# Patient Record
Sex: FEMALE | Race: WHITE | Employment: PART TIME | URBAN - METROPOLITAN AREA
[De-identification: names, ages, dates, MRNs, and addresses within clinical notes are randomized per-mention and may not be internally consistent; named-entity substitution may affect disease eponyms.]

---

## 2019-04-11 LAB
HPV COMMENT: NORMAL
HPV TYPE 16: NOT DETECTED
HPV TYPE 18: NOT DETECTED
HPVOH (OTHER TYPES): NOT DETECTED

## 2019-09-11 ENCOUNTER — HOSPITAL ENCOUNTER (EMERGENCY)
Age: 34
Discharge: HOME | End: 2019-09-11
Payer: COMMERCIAL

## 2019-09-11 VITALS
DIASTOLIC BLOOD PRESSURE: 91 MMHG | HEIGHT: 70 IN | TEMPERATURE: 98 F | HEART RATE: 86 BPM | SYSTOLIC BLOOD PRESSURE: 134 MMHG | BODY MASS INDEX: 29.41 KG/M2 | OXYGEN SATURATION: 96 % | RESPIRATION RATE: 16 BRPM

## 2019-09-11 DIAGNOSIS — M54.41 ACUTE RIGHT-SIDED LOW BACK PAIN WITH RIGHT-SIDED SCIATICA: Primary | ICD-10-CM

## 2019-09-11 PROCEDURE — 96372 THER/PROPH/DIAG INJ SC/IM: CPT

## 2019-09-11 PROCEDURE — 99283 EMERGENCY DEPT VISIT LOW MDM: CPT

## 2019-09-11 PROCEDURE — 6370000000 HC RX 637 (ALT 250 FOR IP): Performed by: PHYSICIAN ASSISTANT

## 2019-09-11 PROCEDURE — 6360000002 HC RX W HCPCS: Performed by: PHYSICIAN ASSISTANT

## 2019-09-11 RX ORDER — KETOROLAC TROMETHAMINE 30 MG/ML
30 INJECTION, SOLUTION INTRAMUSCULAR; INTRAVENOUS ONCE
Status: COMPLETED | OUTPATIENT
Start: 2019-09-11 | End: 2019-09-11

## 2019-09-11 RX ORDER — IBUPROFEN 600 MG/1
600 TABLET ORAL EVERY 6 HOURS PRN
Qty: 20 TABLET | Refills: 0 | Status: SHIPPED | OUTPATIENT
Start: 2019-09-11

## 2019-09-11 RX ORDER — ACETAMINOPHEN 500 MG
1000 TABLET ORAL EVERY 6 HOURS PRN
Qty: 30 TABLET | Refills: 0 | Status: SHIPPED | OUTPATIENT
Start: 2019-09-11

## 2019-09-11 RX ORDER — CYCLOBENZAPRINE HCL 10 MG
10 TABLET ORAL NIGHTLY PRN
Qty: 20 TABLET | Refills: 0 | Status: SHIPPED | OUTPATIENT
Start: 2019-09-11

## 2019-09-11 RX ORDER — CYCLOBENZAPRINE HCL 10 MG
10 TABLET ORAL ONCE
Status: COMPLETED | OUTPATIENT
Start: 2019-09-11 | End: 2019-09-11

## 2019-09-11 RX ORDER — LIDOCAINE 50 MG/G
1 PATCH TOPICAL DAILY
Qty: 10 PATCH | Refills: 0 | Status: SHIPPED | OUTPATIENT
Start: 2019-09-11

## 2019-09-11 RX ADMIN — KETOROLAC TROMETHAMINE 30 MG: 30 INJECTION, SOLUTION INTRAMUSCULAR at 17:00

## 2019-09-11 RX ADMIN — CYCLOBENZAPRINE HYDROCHLORIDE 10 MG: 10 TABLET, FILM COATED ORAL at 17:00

## 2019-09-11 ASSESSMENT — PAIN DESCRIPTION - PAIN TYPE: TYPE: ACUTE PAIN

## 2019-09-11 ASSESSMENT — PAIN SCALES - GENERAL: PAINLEVEL_OUTOF10: 8

## 2019-09-11 ASSESSMENT — PAIN DESCRIPTION - PROGRESSION: CLINICAL_PROGRESSION: GRADUALLY WORSENING

## 2019-09-11 ASSESSMENT — PAIN DESCRIPTION - ONSET: ONSET: PROGRESSIVE

## 2019-09-11 ASSESSMENT — PAIN DESCRIPTION - DESCRIPTORS: DESCRIPTORS: STABBING

## 2019-09-11 ASSESSMENT — PAIN DESCRIPTION - ORIENTATION: ORIENTATION: LOWER

## 2019-09-11 ASSESSMENT — PAIN DESCRIPTION - LOCATION: LOCATION: BACK

## 2019-09-11 ASSESSMENT — PAIN - FUNCTIONAL ASSESSMENT: PAIN_FUNCTIONAL_ASSESSMENT: PREVENTS OR INTERFERES SOME ACTIVE ACTIVITIES AND ADLS

## 2019-09-11 ASSESSMENT — PAIN DESCRIPTION - FREQUENCY: FREQUENCY: CONTINUOUS

## 2019-09-11 NOTE — ED PROVIDER NOTES
[address]  [address]   Dept: [phone]  Loc: [address] ENCOUNTER        This patient was not seen or evaluated by the attending physician. I evaluated this patient, the attending physician was available for consultation. CHIEF COMPLAINT    Chief Complaint   Patient presents with    Back Pain     lower back pain since monday, down right hip and leg       HPI    Thais Hutchison is a 30 y.o. female who presents with back pain, localized in the right lumbar region of the back, radiating down the lateral thigh. The onset was 2 days ago. The duration has been intermittent since the onset. The quality of the pain is sharp. The pain worsens with movement. The context is that the patient bends over all day at work as a . REVIEW OF SYSTEMS    General: No fevers or chills  GI: No abdominal pain or vomiting  : No dysuria or hematuria  Musculoskeletal: see HPI  Neurologic: No bowel or bladder incontinence, No saddle anesthesia, No leg weakness  All other systems reviewed and are negative. PAST MEDICAL & SURGICAL HISTORY    Past Medical History:   Diagnosis Date    Bladder incontinence      Past Surgical History:   Procedure Laterality Date     SECTION      HAND TENDON SURGERY      LEFT THUMB       CURRENT MEDICATIONS  (may include discharge medications prescribed in the ED)  Current Outpatient Rx   Medication Sig Dispense Refill    prenatal vitamin (FORMULA 3) TABS Take 1 tablet by mouth daily.          ALLERGIES    No Known Allergies    SOCIAL HISTORY    Social History     Socioeconomic History    Marital status: Single     Spouse name: None    Number of children: None    Years of education: None    Highest education level: None   Occupational History    None   Social Needs    Financial resource strain: None    Food insecurity:     Worry: None

## 2019-09-11 NOTE — LETTER
[facility] Emergency Department  [address] [facility] [address]  Phone: [phone]             September 11, 2019    Patient: Marta Blankenship   YOB: 1985   Date of Visit: 9/11/2019       To Whom It May Concern:    Marta Blankenship was seen and treated in our emergency department on 9/11/2019. She may return to work on 9/15/2019 without restrictions. Sincerely,             Signature:__________________________________    V. O.  PETE Bernal/ARACELI real

## 2019-09-11 NOTE — ED NOTES
D/C instructions, including RX and follow up care given to pt. Pt verbalized understanding and denies further questions or needs at this time. Ambulatory to waiting room with steady gait. FELIPE Marrero RN  09/11/19 0472

## 2022-05-05 ENCOUNTER — OFFICE VISIT (OUTPATIENT)
Dept: UROGYNECOLOGY | Age: 37
End: 2022-05-05
Payer: COMMERCIAL

## 2022-05-05 VITALS
HEART RATE: 85 BPM | SYSTOLIC BLOOD PRESSURE: 136 MMHG | OXYGEN SATURATION: 98 % | TEMPERATURE: 98.2 F | DIASTOLIC BLOOD PRESSURE: 96 MMHG | RESPIRATION RATE: 14 BRPM

## 2022-05-05 DIAGNOSIS — R39.15 URINARY URGENCY: ICD-10-CM

## 2022-05-05 DIAGNOSIS — R35.0 URINARY FREQUENCY: ICD-10-CM

## 2022-05-05 DIAGNOSIS — R30.0 DYSURIA: ICD-10-CM

## 2022-05-05 DIAGNOSIS — N39.41 URGE INCONTINENCE: Primary | ICD-10-CM

## 2022-05-05 PROCEDURE — G8421 BMI NOT CALCULATED: HCPCS | Performed by: OBSTETRICS & GYNECOLOGY

## 2022-05-05 PROCEDURE — 4004F PT TOBACCO SCREEN RCVD TLK: CPT | Performed by: OBSTETRICS & GYNECOLOGY

## 2022-05-05 PROCEDURE — 99203 OFFICE O/P NEW LOW 30 MIN: CPT | Performed by: OBSTETRICS & GYNECOLOGY

## 2022-05-05 PROCEDURE — G8427 DOCREV CUR MEDS BY ELIG CLIN: HCPCS | Performed by: OBSTETRICS & GYNECOLOGY

## 2022-05-05 RX ORDER — BUPROPION HYDROCHLORIDE 300 MG/1
TABLET ORAL
COMMUNITY
Start: 2022-02-28

## 2022-05-05 RX ORDER — MIRTAZAPINE 30 MG/1
TABLET, FILM COATED ORAL
COMMUNITY
Start: 2022-04-26

## 2022-05-05 RX ORDER — LURASIDONE HYDROCHLORIDE 80 MG/1
TABLET, FILM COATED ORAL
COMMUNITY
Start: 2022-04-06

## 2022-05-05 RX ORDER — DEXTROAMPHETAMINE SACCHARATE, AMPHETAMINE ASPARTATE, DEXTROAMPHETAMINE SULFATE AND AMPHETAMINE SULFATE 5; 5; 5; 5 MG/1; MG/1; MG/1; MG/1
20 TABLET ORAL
COMMUNITY

## 2022-05-05 NOTE — PROGRESS NOTES
5/5/2022      HPI:     Name: Perfecto Grewal  YOB: 1985    CC: Patient is a 31 y.o. female who is seen in consultation from No ref. provider found   for evaluation of medtronic device. Patient states she would like it removed. HPI:  Bladder control problem: Yes   How many months have you had a bladder problem? Her whole life per patient  Do you use pads to absorb lost urine? Yes. If yes how many pads do you wear a day? 2   How many trips do you make to the bathroom during the day? unanswered  How many times do you wake at night to go to the bathroom? 1-2  Do you ever wet the bed while asleep? No  Are there times when you cannot make it to the bathroom on time? Yes  Does sound, sight, or feel of running water cause you to lose urine? No  How many ounces of liquid do you consume daily? unanswered  How many drinks containing caffeine do you consume daily? 1  Which best describes urine loss: (Check all that apply)   [x] I lose urine during coughing, sneezing, running, lifting   [x] I lose urine with changes in posture, standing, walking   [x] I lose urine continuously such that I am constantly wet   [x] I have sudden, urgent needs without the ability to make it to the bathroom  Have you seen a physician for complaints of urine loss? Yes has seen this provider over at [facility] before. Have you taken medication to prevent urine loss? No     Bladder emptying problems:  Yes   How long have you had bladder emptying problems? years  Do you notice any dribbling of urine when you stand after passing urine? Yes  Do you usually have difficulty starting your urine stream? Yes  Do you have to assume abnormal positions to urinate? No   Do you have to strain to empty your bladder? Yes  Do you feel as if your bladder is empty after passing urine? unanswered  Is your urine flow: intermittent and weak    Prolapse/Vaginal Support problems: No   Bowel problem(s): Yes   How long have you had bowel symptoms?  4 years  Do you have accidental loss of solid stool? Yes  Do you have accidental loss of liquid stool? Yes  Do you have accidental loss of gas? No  How many episodes per week? Do you have constipation? No Do you have diarrhea? No Problems with bloating? No  Do you have frequent desire to have a bowel movement? No  Do you feel that your bowels are never completely empty? Yes  Do you ever place your fingers in your vagina or between the vagina and rectum to help with a bowel movement? No  Have you seen a physician for bowel symptoms? No   Sexual History:  reports being sexually active and has had partner(s) who are male. Pelvic Pain:  No   Ob/Gyn History:    OB History    Para Term  AB Living   2 2 2 0 0 2   SAB IAB Ectopic Molar Multiple Live Births   0 0 0   0 2      # Outcome Date GA Lbr Arya/2nd Weight Sex Delivery Anes PTL Lv   2 Term 02/25/10   8 lb 3 oz (3.714 kg) M CS-LTranv EPI N NICK   1 Term      CS-LTranv   NICK     Past Medical History:   Past Medical History:   Diagnosis Date    Bladder incontinence     Depression      Past Surgical History:   Past Surgical History:   Procedure Laterality Date     SECTION  2010     SECTION  2011    HAND TENDON SURGERY      LEFT THUMB     Allergies: No Known Allergies  Current Medications:  Current Outpatient Medications   Medication Sig Dispense Refill    amphetamine-dextroamphetamine (ADDERALL) 20 MG tablet Take 20 mg by mouth every morning (before breakfast).       buPROPion (WELLBUTRIN XL) 300 MG extended release tablet TAKE 1 TABLET BY MOUTH EVERY DAY      LATUDA 80 MG TABS tablet TAKE 1 TABLET BY MOUTH EVERY EVENING WITH A MEAL      mirtazapine (REMERON) 30 MG tablet TAKE 1 TABLET BY MOUTH EVERYDAY AT BEDTIME      ibuprofen (ADVIL;MOTRIN) 600 MG tablet Take 1 tablet by mouth every 6 hours as needed for Pain (Patient not taking: Reported on 2022) 20 tablet 0    acetaminophen (APAP EXTRA STRENGTH) 500 MG tablet Take 2 tablets by mouth every 6 hours as needed for Pain DO NOT TAKE WITH OTHER MEDICATIONS CONTAINING ACETAMINOPHEN. 30 tablet 0    cyclobenzaprine (FLEXERIL) 10 MG tablet Take 1 tablet by mouth nightly as needed for Muscle spasms (Patient not taking: Reported on 5/5/2022) 20 tablet 0    lidocaine (LIDODERM) 5 % Place 1 patch onto the skin daily 12 hours on, 12 hours off. **May substitute OTC patches if Rx not covered by insurance** (Patient not taking: Reported on 5/5/2022) 10 patch 0    prenatal vitamin (FORMULA 3) TABS Take 1 tablet by mouth daily. (Patient not taking: Reported on 5/5/2022)       No current facility-administered medications for this visit. Social History:   Social History     Socioeconomic History    Marital status: Single     Spouse name: Not on file    Number of children: Not on file    Years of education: Not on file    Highest education level: Not on file   Occupational History    Not on file   Tobacco Use    Smoking status: Current Every Day Smoker     Packs/day: 0.50    Smokeless tobacco: Never Used   Substance and Sexual Activity    Alcohol use: No    Drug use: No    Sexual activity: Yes     Partners: Male   Other Topics Concern    Not on file   Social History Narrative    Not on file     Social Determinants of Health     Financial Resource Strain:     Difficulty of Paying Living Expenses: Not on file   Food Insecurity:     Worried About Running Out of Food in the Last Year: Not on file    Kim of Food in the Last Year: Not on file   Transportation Needs:     Lack of Transportation (Medical): Not on file    Lack of Transportation (Non-Medical):  Not on file   Physical Activity:     Days of Exercise per Week: Not on file    Minutes of Exercise per Session: Not on file   Stress:     Feeling of Stress : Not on file   Social Connections:     Frequency of Communication with Friends and Family: Not on file    Frequency of Social Gatherings with Friends and Family: Not on file    Attends Roman Catholic Services: Not on file    Active Member of Clubs or Organizations: Not on file    Attends Club or Organization Meetings: Not on file    Marital Status: Not on file   Intimate Partner Violence:     Fear of Current or Ex-Partner: Not on file    Emotionally Abused: Not on file    Physically Abused: Not on file    Sexually Abused: Not on file   Housing Stability:     Unable to Pay for Housing in the Last Year: Not on file    Number of [place] in the Last Year: Not on file    Unstable Housing in the Last Year: Not on file     Family History: History reviewed. No pertinent family history. Review of Systems:  Review of Systems        Objective:     Vital Signs  Vitals:    05/05/22 1406   BP: (!) 136/96   Pulse: 85   Resp: 14   Temp: 98.2 °F (36.8 °C)   SpO2: 98%     Physical Exam  Physical Exam  HENT:      Head: Normocephalic and atraumatic. Eyes:      Conjunctiva/sclera: Conjunctivae normal.   Pulmonary:      Effort: Pulmonary effort is normal.   Abdominal:      Palpations: Abdomen is soft. Musculoskeletal:      Cervical back: Normal range of motion and neck supple. Skin:     General: Skin is warm and dry. Neurological:      Mental Status: She is alert and oriented to person, place, and time. Assessment/Plan:     Maria C Caraballo is a 31 y.o. female with   1. Urge incontinence    2. Urinary frequency    3. Urinary urgency    4. Dysuria    Old records reviewed, outside records reviewed    Bryan Avina is an old patient of mine last seen in 2018. At that time she had significant urinary urgency frequency urge incontinence and dysuria. She has already tried anticholinergic medications, intravesical instillations, and InterStim therapy. She presents today requesting that the InterStim be removed and wanted to consider Botox which was our plan to try next back in 2018.   At that time I had also given her the option to get a second opinion with Dr. Nilda Bhatti at [facility]. When we discussed all of this again she would like to follow-up at [facility] prior to taking out the InterStim therapy and I agree that this is probably a very good option. No orders of the defined types were placed in this encounter. No orders of the defined types were placed in this encounter.       Helena Clemons MD

## 2022-11-15 ENCOUNTER — TELEPHONE (OUTPATIENT)
Dept: CARDIOLOGY CLINIC | Age: 37
End: 2022-11-15

## 2022-11-15 NOTE — TELEPHONE ENCOUNTER
Left Madhav Gray a message regarding scheduling an appt with Dr Law Swain for ? PVC's. negative - no dysuria

## 2022-11-17 NOTE — TELEPHONE ENCOUNTER
3rd message left regarding new patient appt for pt. Asked that she call back if she would like to schedule an appt.

## 2023-02-03 LAB
ABO GROUPING: NORMAL
ANTIBODY SCREEN: NEGATIVE
BASOPHILS ABSOLUTE: 52 /UL (ref 0–200)
BASOPHILS RELATIVE PERCENT: 0.5 % (ref 0–1)
EOSINOPHILS ABSOLUTE: 330 /UL (ref 15–500)
EOSINOPHILS RELATIVE PERCENT: 3.2 % (ref 0–8)
HCT VFR BLD CALC: 34.4 % (ref 35–45)
HEMOGLOBIN: 11.7 G/DL (ref 11.7–15.5)
LYMPHOCYTES ABSOLUTE: 1720 /UL (ref 850–3900)
LYMPHOCYTES RELATIVE PERCENT: 16.7 % (ref 15–45)
MCH RBC QN AUTO: 31.3 PG (ref 27–33)
MCHC RBC AUTO-ENTMCNC: 34 G/DL (ref 32–36)
MCV RBC AUTO: 92.1 FL (ref 80–100)
MONOCYTES ABSOLUTE: 742 /UL (ref 200–950)
MONOCYTES RELATIVE PERCENT: 7.2 % (ref 0–12)
NEUTROPHILS ABSOLUTE: 7457 /UL (ref 1500–7800)
NUCLEATED RED BLOOD CELLS: 0 /100 WBC (ref 0–0)
PDW BLD-RTO: 13 % (ref 11–15)
PLATELET # BLD: 275 10E3/UL (ref 140–400)
PMV BLD AUTO: 8 FL (ref 7.5–11.5)
RBC # BLD: 3.73 10E6/UL (ref 3.8–5.1)
RH FACTOR: POSITIVE
SEGMENTED NEUTROPHILS RELATIVE PERCENT: 72.4 % (ref 40–80)
WBC # BLD: 10.3 10E3/UL (ref 3.8–10.8)

## 2023-03-30 ENCOUNTER — OFFICE VISIT (OUTPATIENT)
Dept: ENT CLINIC | Age: 38
End: 2023-03-30
Payer: COMMERCIAL

## 2023-03-30 VITALS
WEIGHT: 219 LBS | DIASTOLIC BLOOD PRESSURE: 89 MMHG | HEART RATE: 82 BPM | HEIGHT: 70 IN | SYSTOLIC BLOOD PRESSURE: 129 MMHG | BODY MASS INDEX: 31.35 KG/M2

## 2023-03-30 DIAGNOSIS — H61.22 IMPACTED CERUMEN OF LEFT EAR: Primary | ICD-10-CM

## 2023-03-30 DIAGNOSIS — H60.392 OTHER INFECTIVE ACUTE OTITIS EXTERNA OF LEFT EAR: ICD-10-CM

## 2023-03-30 PROCEDURE — G8427 DOCREV CUR MEDS BY ELIG CLIN: HCPCS | Performed by: OTOLARYNGOLOGY

## 2023-03-30 PROCEDURE — G8484 FLU IMMUNIZE NO ADMIN: HCPCS | Performed by: OTOLARYNGOLOGY

## 2023-03-30 PROCEDURE — 4004F PT TOBACCO SCREEN RCVD TLK: CPT | Performed by: OTOLARYNGOLOGY

## 2023-03-30 PROCEDURE — 4130F TOPICAL PREP RX AOE: CPT | Performed by: OTOLARYNGOLOGY

## 2023-03-30 PROCEDURE — 69210 REMOVE IMPACTED EAR WAX UNI: CPT | Performed by: OTOLARYNGOLOGY

## 2023-03-30 PROCEDURE — G8417 CALC BMI ABV UP PARAM F/U: HCPCS | Performed by: OTOLARYNGOLOGY

## 2023-03-30 PROCEDURE — 99203 OFFICE O/P NEW LOW 30 MIN: CPT | Performed by: OTOLARYNGOLOGY

## 2023-03-30 RX ORDER — OFLOXACIN 3 MG/ML
4 SOLUTION/ DROPS OPHTHALMIC 2 TIMES DAILY
Qty: 1 EACH | Refills: 0 | Status: SHIPPED | OUTPATIENT
Start: 2023-03-30 | End: 2023-04-04

## 2023-03-30 NOTE — PROGRESS NOTES
[facility]      Patient Name: Joella Romberg  Medical Record Number:  [id number]  Primary Care Physician:  No primary care provider on file. Date of Consultation: 3/30/2023    Chief Complaint: Ear issues        HISTORY OF PRESENT ILLNESS  Jeanmarie Veras is a(n) 30 y.o. female who presents for evaluation of ear issues. The patient says that her left ear has been plugged for a while. She says it is a little bit uncomfortable and is muffling her hearing. She does use Q-tips. She said that she had a camera on her iPhone it looks like there is something black in her ear. She denies significant ear history. She has never had ear surgery. REVIEW OF SYSTEMS  As above    PHYSICAL EXAM  GENERAL: No Acute Distress, Alert and Oriented, no Hoarseness, strong voice  EYES: EOMI, Anti-icteric  HENT:   Head: Normocephalic and atraumatic. Face:  Symmetric, facial nerve intact, no sinus tenderness  Ears: See below  Mouth/Oral Cavity:  normal lips, Uvula is midline, no mucosal lesions  Oropharynx/Larynx:  normal oropharynx  Nose:Normal external nasal appearance. NECK: Normal range of motion, no thyromegaly, trachea is midline, no lymphadenopathy, no neck masses, no crepitus      PROCEDURE  Bilateral ear exam and cerumen removal  The right ear was visualized binocular scope. Tympanic membrane was intact with an aerated middle ear    On the left side the patient had a dense cerumen impaction pushed against the tympanic membrane that I removed with a Poe suction. The medial canal and eardrum were inflamed. Middle ear did appear to be aerated. ASSESSMENT/PLAN  1. Impacted cerumen of left ear  Removed today in clinic. Recommend she stop using Q-tips    2. Other infective acute otitis externa of left ear  She did have a mild otitis externa associated with a cerumen impaction. I will give her a short course of eardrops to prevent a worsening infection.

## 2023-09-25 ENCOUNTER — OFFICE VISIT (OUTPATIENT)
Age: 38
End: 2023-09-25

## 2023-09-25 VITALS
DIASTOLIC BLOOD PRESSURE: 102 MMHG | OXYGEN SATURATION: 98 % | WEIGHT: 217 LBS | HEIGHT: 70 IN | BODY MASS INDEX: 31.07 KG/M2 | HEART RATE: 84 BPM | TEMPERATURE: 98.2 F | SYSTOLIC BLOOD PRESSURE: 154 MMHG

## 2023-09-25 DIAGNOSIS — I10 PRIMARY HYPERTENSION: ICD-10-CM

## 2023-09-25 DIAGNOSIS — J20.9 ACUTE BRONCHITIS, UNSPECIFIED ORGANISM: ICD-10-CM

## 2023-09-25 DIAGNOSIS — R05.1 ACUTE COUGH: Primary | ICD-10-CM

## 2023-09-25 LAB
Lab: NORMAL
QC PASS/FAIL: NORMAL
SARS-COV-2 RDRP RESP QL NAA+PROBE: NEGATIVE

## 2023-09-25 RX ORDER — PREDNISONE 20 MG/1
20 TABLET ORAL 2 TIMES DAILY
Qty: 10 TABLET | Refills: 0 | Status: SHIPPED | OUTPATIENT
Start: 2023-09-25 | End: 2023-09-30

## 2023-09-25 RX ORDER — AZITHROMYCIN 250 MG/1
250 TABLET, FILM COATED ORAL SEE ADMIN INSTRUCTIONS
Qty: 6 TABLET | Refills: 0 | Status: SHIPPED | OUTPATIENT
Start: 2023-09-25 | End: 2023-09-30

## 2023-09-25 RX ORDER — LISINOPRIL AND HYDROCHLOROTHIAZIDE 12.5; 1 MG/1; MG/1
1 TABLET ORAL DAILY
Qty: 30 TABLET | Refills: 0 | Status: SHIPPED | OUTPATIENT
Start: 2023-09-25

## 2023-09-25 RX ORDER — BENZONATATE 100 MG/1
100 CAPSULE ORAL 3 TIMES DAILY PRN
Qty: 21 CAPSULE | Refills: 0 | Status: SHIPPED | OUTPATIENT
Start: 2023-09-25 | End: 2023-10-02

## 2023-09-25 ASSESSMENT — ENCOUNTER SYMPTOMS
SHORTNESS OF BREATH: 0
VOMITING: 0
EYE DISCHARGE: 0
EYE REDNESS: 0
COUGH: 1
WHEEZING: 1
SORE THROAT: 1
NAUSEA: 0
SINUS PRESSURE: 1

## 2024-04-04 ENCOUNTER — APPOINTMENT (OUTPATIENT)
Dept: GENERAL RADIOLOGY | Age: 39
End: 2024-04-04

## 2024-04-04 ENCOUNTER — APPOINTMENT (OUTPATIENT)
Dept: CT IMAGING | Age: 39
End: 2024-04-04

## 2024-04-04 ENCOUNTER — HOSPITAL ENCOUNTER (EMERGENCY)
Age: 39
Discharge: HOME | End: 2024-04-04
Payer: COMMERCIAL

## 2024-04-04 VITALS
OXYGEN SATURATION: 100 % | SYSTOLIC BLOOD PRESSURE: 102 MMHG | HEART RATE: 93 BPM | WEIGHT: 190 LBS | RESPIRATION RATE: 19 BRPM | DIASTOLIC BLOOD PRESSURE: 74 MMHG | BODY MASS INDEX: 27.26 KG/M2 | TEMPERATURE: 97.9 F

## 2024-04-04 DIAGNOSIS — R07.9 CHEST PAIN, UNSPECIFIED TYPE: Primary | ICD-10-CM

## 2024-04-04 LAB
ALBUMIN SERPL-MCNC: 4 G/DL (ref 3.4–5)
ALBUMIN/GLOB SERPL: 1.5 {RATIO} (ref 1.1–2.2)
ALP SERPL-CCNC: 68 U/L (ref 40–129)
ALT SERPL-CCNC: 14 U/L (ref 10–40)
AMPHETAMINES UR QL SCN>1000 NG/ML: POSITIVE
ANION GAP SERPL CALCULATED.3IONS-SCNC: 14 MMOL/L (ref 3–16)
AST SERPL-CCNC: 16 U/L (ref 15–37)
BARBITURATES UR QL SCN>200 NG/ML: ABNORMAL
BASE EXCESS BLDV CALC-SCNC: -0.7 MMOL/L (ref -3–3)
BASOPHILS # BLD: 0 K/UL (ref 0–0.2)
BASOPHILS NFR BLD: 0.2 %
BENZODIAZ UR QL SCN>200 NG/ML: ABNORMAL
BILIRUB SERPL-MCNC: 0.7 MG/DL (ref 0–1)
BUN SERPL-MCNC: 11 MG/DL (ref 7–20)
CALCIUM SERPL-MCNC: 8.7 MG/DL (ref 8.3–10.6)
CANNABINOIDS UR QL SCN>50 NG/ML: POSITIVE
CHLORIDE SERPL-SCNC: 104 MMOL/L (ref 99–110)
CO2 BLDV-SCNC: 47 MMOL/L
CO2 SERPL-SCNC: 18 MMOL/L (ref 21–32)
COCAINE UR QL SCN: ABNORMAL
COHGB MFR BLDV: 3.7 % (ref 0–1.5)
CREAT SERPL-MCNC: <0.5 MG/DL (ref 0.6–1.1)
D DIMER: 0.63 UG/ML FEU (ref 0–0.6)
DEPRECATED RDW RBC AUTO: 13.2 % (ref 12.4–15.4)
DRUG SCREEN COMMENT UR-IMP: ABNORMAL
EKG ATRIAL RATE: 96 BPM
EKG DIAGNOSIS: NORMAL
EKG P AXIS: 53 DEGREES
EKG P-R INTERVAL: 132 MS
EKG Q-T INTERVAL: 368 MS
EKG QRS DURATION: 84 MS
EKG QTC CALCULATION (BAZETT): 464 MS
EKG R AXIS: 57 DEGREES
EKG T AXIS: 8 DEGREES
EKG VENTRICULAR RATE: 96 BPM
EOSINOPHIL # BLD: 0.1 K/UL (ref 0–0.6)
EOSINOPHIL NFR BLD: 0.8 %
FENTANYL SCREEN, URINE: ABNORMAL
GFR SERPLBLD CREATININE-BSD FMLA CKD-EPI: >90 ML/MIN/{1.73_M2}
GLUCOSE SERPL-MCNC: 97 MG/DL (ref 70–99)
HCG SERPL QL: NEGATIVE
HCO3 BLDV-SCNC: 20.1 MMOL/L (ref 23–29)
HCT VFR BLD AUTO: 42.7 % (ref 36–48)
HGB BLD-MCNC: 13.8 G/DL (ref 12–16)
LYMPHOCYTES # BLD: 0.6 K/UL (ref 1–5.1)
LYMPHOCYTES NFR BLD: 9.7 %
MAGNESIUM SERPL-MCNC: 1.8 MG/DL (ref 1.8–2.4)
MCH RBC QN AUTO: 30.1 PG (ref 26–34)
MCHC RBC AUTO-ENTMCNC: 32.2 G/DL (ref 31–36)
MCV RBC AUTO: 93.3 FL (ref 80–100)
METHADONE UR QL SCN>300 NG/ML: ABNORMAL
METHGB MFR BLDV: 0.1 %
MONOCYTES # BLD: 0.4 K/UL (ref 0–1.3)
MONOCYTES NFR BLD: 5.5 %
NEUTROPHILS # BLD: 5.5 K/UL (ref 1.7–7.7)
NEUTROPHILS NFR BLD: 83.8 %
O2 CT VFR BLDV CALC: 18 VOL %
O2 THERAPY: ABNORMAL
OPIATES UR QL SCN>300 NG/ML: ABNORMAL
OXYCODONE UR QL SCN: ABNORMAL
PCO2 BLDV: 23.3 MMHG (ref 40–50)
PCP UR QL SCN>25 NG/ML: ABNORMAL
PH BLDV: 7.54 [PH] (ref 7.35–7.45)
PH UR STRIP: 6.5 [PH]
PLATELET # BLD AUTO: 290 K/UL (ref 135–450)
PMV BLD AUTO: 7.7 FL (ref 5–10.5)
PO2 BLDV: 82.3 MMHG (ref 25–40)
POTASSIUM SERPL-SCNC: 2.6 MMOL/L (ref 3.5–5.1)
POTASSIUM SERPL-SCNC: 3.2 MMOL/L (ref 3.5–5.1)
PROT SERPL-MCNC: 6.6 G/DL (ref 6.4–8.2)
RBC # BLD AUTO: 4.58 M/UL (ref 4–5.2)
SAO2 % BLDV: 98 %
SODIUM SERPL-SCNC: 136 MMOL/L (ref 136–145)
TROPONIN, HIGH SENSITIVITY: 9 NG/L (ref 0–14)
TROPONIN, HIGH SENSITIVITY: <6 NG/L (ref 0–14)
WBC # BLD AUTO: 6.5 K/UL (ref 4–11)

## 2024-04-04 PROCEDURE — 99285 EMERGENCY DEPT VISIT HI MDM: CPT

## 2024-04-04 PROCEDURE — 84703 CHORIONIC GONADOTROPIN ASSAY: CPT

## 2024-04-04 PROCEDURE — 71260 CT THORAX DX C+: CPT

## 2024-04-04 PROCEDURE — 83735 ASSAY OF MAGNESIUM: CPT

## 2024-04-04 PROCEDURE — 85025 COMPLETE CBC W/AUTO DIFF WBC: CPT

## 2024-04-04 PROCEDURE — A4216 STERILE WATER/SALINE, 10 ML: HCPCS | Performed by: PHYSICIAN ASSISTANT

## 2024-04-04 PROCEDURE — 6360000004 HC RX CONTRAST MEDICATION: Performed by: PHYSICIAN ASSISTANT

## 2024-04-04 PROCEDURE — 6370000000 HC RX 637 (ALT 250 FOR IP): Performed by: PHYSICIAN ASSISTANT

## 2024-04-04 PROCEDURE — 96361 HYDRATE IV INFUSION ADD-ON: CPT

## 2024-04-04 PROCEDURE — 93005 ELECTROCARDIOGRAM TRACING: CPT | Performed by: EMERGENCY MEDICINE

## 2024-04-04 PROCEDURE — 71045 X-RAY EXAM CHEST 1 VIEW: CPT

## 2024-04-04 PROCEDURE — 82803 BLOOD GASES ANY COMBINATION: CPT

## 2024-04-04 PROCEDURE — 80307 DRUG TEST PRSMV CHEM ANLYZR: CPT

## 2024-04-04 PROCEDURE — 96374 THER/PROPH/DIAG INJ IV PUSH: CPT

## 2024-04-04 PROCEDURE — 2580000003 HC RX 258: Performed by: PHYSICIAN ASSISTANT

## 2024-04-04 PROCEDURE — 93010 ELECTROCARDIOGRAM REPORT: CPT | Performed by: INTERNAL MEDICINE

## 2024-04-04 PROCEDURE — 6360000002 HC RX W HCPCS: Performed by: PHYSICIAN ASSISTANT

## 2024-04-04 PROCEDURE — 85379 FIBRIN DEGRADATION QUANT: CPT

## 2024-04-04 PROCEDURE — 80053 COMPREHEN METABOLIC PANEL: CPT

## 2024-04-04 PROCEDURE — 84484 ASSAY OF TROPONIN QUANT: CPT

## 2024-04-04 PROCEDURE — 84132 ASSAY OF SERUM POTASSIUM: CPT

## 2024-04-04 RX ORDER — HYDROXYZINE PAMOATE 25 MG/1
25 CAPSULE ORAL 3 TIMES DAILY PRN
Qty: 20 CAPSULE | Refills: 0 | Status: SHIPPED | OUTPATIENT
Start: 2024-04-04 | End: 2024-04-18

## 2024-04-04 RX ORDER — POTASSIUM CHLORIDE 20 MEQ/1
40 TABLET, EXTENDED RELEASE ORAL ONCE
Status: COMPLETED | OUTPATIENT
Start: 2024-04-04 | End: 2024-04-04

## 2024-04-04 RX ORDER — SODIUM CHLORIDE, SODIUM LACTATE, POTASSIUM CHLORIDE, AND CALCIUM CHLORIDE .6; .31; .03; .02 G/100ML; G/100ML; G/100ML; G/100ML
1000 INJECTION, SOLUTION INTRAVENOUS ONCE
Status: COMPLETED | OUTPATIENT
Start: 2024-04-04 | End: 2024-04-04

## 2024-04-04 RX ADMIN — IOPAMIDOL 75 ML: 755 INJECTION, SOLUTION INTRAVENOUS at 14:37

## 2024-04-04 RX ADMIN — POTASSIUM CHLORIDE 40 MEQ: 1500 TABLET, EXTENDED RELEASE ORAL at 16:18

## 2024-04-04 RX ADMIN — SODIUM CHLORIDE 1 MG: 9 INJECTION INTRAMUSCULAR; INTRAVENOUS; SUBCUTANEOUS at 13:59

## 2024-04-04 RX ADMIN — SODIUM CHLORIDE, POTASSIUM CHLORIDE, SODIUM LACTATE AND CALCIUM CHLORIDE 1000 ML: 600; 310; 30; 20 INJECTION, SOLUTION INTRAVENOUS at 14:02

## 2024-04-04 ASSESSMENT — PAIN - FUNCTIONAL ASSESSMENT: PAIN_FUNCTIONAL_ASSESSMENT: NONE - DENIES PAIN

## 2024-04-04 NOTE — ED PROVIDER NOTES
[facility] EMERGENCY DEPARTMENT  EMERGENCY DEPARTMENT ENCOUNTER        Pt Name: Sammi Heredia  MRN: [id number]  Birthdate 1985  Date of evaluation: 2024  Provider: Fadi Gould PA-C  PCP: No primary care provider on file.  Note Started: 1:21 PM EDT 24      SHANE. I have evaluated this patient.        CHIEF COMPLAINT       Chief Complaint   Patient presents with    Chest Pain     FF EMS from home d/t chest pain that radiates to the back. Also c/o emesis and dizziness. Ems gave 324 baby aspirin en route. Pt appears anxious during triage, states numbness in hands        HISTORY OF PRESENT ILLNESS: 1 or more Elements     History From: patient    Sammi Heredia is a 38 y.o. female who presents via EMS today for chest pain that is radiating to upper back. She has been having this pain for the past couple months and states it just feels \"tight\" and that her back muscles also feel tight. Today at work she felt lightheaded, and states she passed out. Denies hitting her head, she was sitting in a chair when this episode happened, and did not lose consciousness. It was a short episode. Her mother is at bed side and states she has been under a very large amount of stress recently. She complains of N, V, Dizziness, numbness in bilateral hands and bilateral feet. She states she cannot walk because her legs are too weak. Admits to SOB, and palpitations. Denies cough, wheezing, leg swelling.     Nursing Notes were all reviewed and agreed with or any disagreements were addressed in the HPI.    REVIEW OF SYSTEMS :      Review of Systems   All other systems reviewed and are negative.      Positives and Pertinent negatives as per HPI.       PAST MEDICAL HISTORY    has a past medical history of Bladder incontinence, Depression, and Hypertension.     SURGICAL HISTORY     Past Surgical History:   Procedure Laterality Date     SECTION  2010     SECTION  2011    HAND TENDON  mEq (40 mEq Oral Given 4/4/24 2909)             Is this patient to be included in the SEP-1 Core Measure due to severe sepsis or septic shock?   No   Exclusion criteria - the patient is NOT to be included for SEP-1 Core Measure due to:  Infection is not suspected    Chronic Conditions affecting care:    has a past medical history of Bladder incontinence, Depression, and Hypertension.    CONSULTS: (Who and What was discussed)  None      Social Determinants : None    Records Reviewed (Source):     CC/HPI Summary, DDx, ED Course, and Reassessment:     Sammi Heredia is a 38 y.o. female who presents via EMS today for chest pain that is radiating to upper back. She has been having this pain for the past couple months and states it just feels \"tight\" and that her back muscles also feel tight. Today at work she felt lightheaded, and states she passed out. Denies hitting her head, she was sitting in a chair when this episode happened, and did not lose consciousness. It was a short episode. Her mother is at bed side and states she has been under a very large amount of stress recently. She complains of N, V, Dizziness, numbness in bilateral hands and bilateral feet. She states she cannot walk because her legs are too weak. Admits to SOB, and palpitations. Denies cough, wheezing, leg swelling.     On exam, tachycardic, anxious appearing, nonfocal exam.  Workup negative for emergent finding.  VBG did show an acute respiratory alkalosis, consistent with panic attack, tachypnea.  No evidence of hypoxia.  CT chest negative.  Troponin negative x 2.  EKG sinus tach without acute ischemic changes.  Heart score less than 4.  Low concern for ACS, PE, aortic disease, pericarditis.  Initial potassium came back at 2.6, I believe this was due to her acute respiratory alkalosis and potassium shift, it was repeated and came back at 3.2.  Patient tolerated 40 mill equivalents potassium without difficulty.  Nonfocal exam.  Low concern for CVA,

## 2024-04-11 ENCOUNTER — OFFICE VISIT (OUTPATIENT)
Dept: INTERNAL MEDICINE CLINIC | Age: 39
End: 2024-04-11

## 2024-04-11 VITALS
HEART RATE: 106 BPM | DIASTOLIC BLOOD PRESSURE: 76 MMHG | SYSTOLIC BLOOD PRESSURE: 122 MMHG | HEIGHT: 70 IN | WEIGHT: 183 LBS | BODY MASS INDEX: 26.2 KG/M2 | OXYGEN SATURATION: 98 %

## 2024-04-11 DIAGNOSIS — E87.6 HYPOKALEMIA: ICD-10-CM

## 2024-04-11 DIAGNOSIS — I10 PRIMARY HYPERTENSION: ICD-10-CM

## 2024-04-11 DIAGNOSIS — Z11.59 ENCOUNTER FOR HEPATITIS C SCREENING TEST FOR LOW RISK PATIENT: ICD-10-CM

## 2024-04-11 DIAGNOSIS — R00.2 PALPITATIONS: ICD-10-CM

## 2024-04-11 DIAGNOSIS — F32.A ANXIETY AND DEPRESSION: Primary | ICD-10-CM

## 2024-04-11 DIAGNOSIS — E66.3 OVERWEIGHT: ICD-10-CM

## 2024-04-11 DIAGNOSIS — Z72.0 TOBACCO ABUSE: ICD-10-CM

## 2024-04-11 DIAGNOSIS — R32 URINARY INCONTINENCE, UNSPECIFIED TYPE: ICD-10-CM

## 2024-04-11 DIAGNOSIS — F90.9 ATTENTION DEFICIT HYPERACTIVITY DISORDER (ADHD), UNSPECIFIED ADHD TYPE: ICD-10-CM

## 2024-04-11 DIAGNOSIS — F41.9 ANXIETY AND DEPRESSION: Primary | ICD-10-CM

## 2024-04-11 LAB
ALBUMIN SERPL-MCNC: 4.9 G/DL (ref 3.4–5)
ALBUMIN/GLOB SERPL: 1.8 {RATIO} (ref 1.1–2.2)
ALP SERPL-CCNC: 68 U/L (ref 40–129)
ALT SERPL-CCNC: 28 U/L (ref 10–40)
ANION GAP SERPL CALCULATED.3IONS-SCNC: 13 MMOL/L (ref 3–16)
AST SERPL-CCNC: 19 U/L (ref 15–37)
BILIRUB SERPL-MCNC: <0.2 MG/DL (ref 0–1)
BUN SERPL-MCNC: 12 MG/DL (ref 7–20)
CALCIUM SERPL-MCNC: 10.1 MG/DL (ref 8.3–10.6)
CHLORIDE SERPL-SCNC: 104 MMOL/L (ref 99–110)
CHOLEST SERPL-MCNC: 145 MG/DL (ref 0–199)
CO2 SERPL-SCNC: 23 MMOL/L (ref 21–32)
CREAT SERPL-MCNC: 0.7 MG/DL (ref 0.6–1.1)
GFR SERPLBLD CREATININE-BSD FMLA CKD-EPI: >90 ML/MIN/{1.73_M2}
GLUCOSE SERPL-MCNC: 96 MG/DL (ref 70–99)
HDLC SERPL-MCNC: 46 MG/DL (ref 40–60)
LDLC SERPL CALC-MCNC: 85 MG/DL
POTASSIUM SERPL-SCNC: 4.4 MMOL/L (ref 3.5–5.1)
PROT SERPL-MCNC: 7.7 G/DL (ref 6.4–8.2)
SODIUM SERPL-SCNC: 140 MMOL/L (ref 136–145)
TRIGL SERPL-MCNC: 71 MG/DL (ref 0–150)
VLDLC SERPL CALC-MCNC: 14 MG/DL

## 2024-04-11 RX ORDER — PROPRANOLOL HYDROCHLORIDE 10 MG/1
10 TABLET ORAL PRN
COMMUNITY

## 2024-04-11 RX ORDER — MECLIZINE HYDROCHLORIDE 25 MG/1
25 TABLET ORAL EVERY 6 HOURS PRN
COMMUNITY
Start: 2024-04-07

## 2024-04-11 RX ORDER — ONDANSETRON 4 MG/1
4 TABLET, ORALLY DISINTEGRATING ORAL EVERY 8 HOURS PRN
COMMUNITY
Start: 2024-04-07

## 2024-04-11 RX ORDER — LISINOPRIL 20 MG/1
20 TABLET ORAL DAILY
Qty: 90 TABLET | Refills: 3 | Status: SHIPPED | OUTPATIENT
Start: 2024-04-11

## 2024-04-11 SDOH — ECONOMIC STABILITY: FOOD INSECURITY: WITHIN THE PAST 12 MONTHS, YOU WORRIED THAT YOUR FOOD WOULD RUN OUT BEFORE YOU GOT MONEY TO BUY MORE.: NEVER TRUE

## 2024-04-11 SDOH — ECONOMIC STABILITY: FOOD INSECURITY: WITHIN THE PAST 12 MONTHS, THE FOOD YOU BOUGHT JUST DIDN'T LAST AND YOU DIDN'T HAVE MONEY TO GET MORE.: NEVER TRUE

## 2024-04-11 SDOH — ECONOMIC STABILITY: HOUSING INSECURITY
IN THE LAST 12 MONTHS, WAS THERE A TIME WHEN YOU DID NOT HAVE A STEADY PLACE TO SLEEP OR SLEPT IN A SHELTER (INCLUDING NOW)?: NO

## 2024-04-11 SDOH — ECONOMIC STABILITY: INCOME INSECURITY: HOW HARD IS IT FOR YOU TO PAY FOR THE VERY BASICS LIKE FOOD, HOUSING, MEDICAL CARE, AND HEATING?: SOMEWHAT HARD

## 2024-04-11 SDOH — HEALTH STABILITY: PHYSICAL HEALTH: ON AVERAGE, HOW MANY DAYS PER WEEK DO YOU ENGAGE IN MODERATE TO STRENUOUS EXERCISE (LIKE A BRISK WALK)?: 7 DAYS

## 2024-04-11 SDOH — HEALTH STABILITY: PHYSICAL HEALTH: ON AVERAGE, HOW MANY MINUTES DO YOU ENGAGE IN EXERCISE AT THIS LEVEL?: 150+ MIN

## 2024-04-11 ASSESSMENT — ENCOUNTER SYMPTOMS
SHORTNESS OF BREATH: 0
ABDOMINAL PAIN: 0
WHEEZING: 0
EYE REDNESS: 0
COLOR CHANGE: 0
EYE PAIN: 0
BLOOD IN STOOL: 0
SINUS PRESSURE: 0

## 2024-04-11 ASSESSMENT — PATIENT HEALTH QUESTIONNAIRE - PHQ9
SUM OF ALL RESPONSES TO PHQ QUESTIONS 1-9: 10
SUM OF ALL RESPONSES TO PHQ9 QUESTIONS 1 & 2: 0
SUM OF ALL RESPONSES TO PHQ QUESTIONS 1-9: 10
3. TROUBLE FALLING OR STAYING ASLEEP: MORE THAN HALF THE DAYS
4. FEELING TIRED OR HAVING LITTLE ENERGY: NEARLY EVERY DAY
1. LITTLE INTEREST OR PLEASURE IN DOING THINGS: NOT AT ALL
6. FEELING BAD ABOUT YOURSELF - OR THAT YOU ARE A FAILURE OR HAVE LET YOURSELF OR YOUR FAMILY DOWN: NOT AT ALL
9. THOUGHTS THAT YOU WOULD BE BETTER OFF DEAD, OR OF HURTING YOURSELF: NOT AT ALL
10. IF YOU CHECKED OFF ANY PROBLEMS, HOW DIFFICULT HAVE THESE PROBLEMS MADE IT FOR YOU TO DO YOUR WORK, TAKE CARE OF THINGS AT HOME, OR GET ALONG WITH OTHER PEOPLE: EXTREMELY DIFFICULT
SUM OF ALL RESPONSES TO PHQ QUESTIONS 1-9: 10
5. POOR APPETITE OR OVEREATING: NEARLY EVERY DAY
7. TROUBLE CONCENTRATING ON THINGS, SUCH AS READING THE NEWSPAPER OR WATCHING TELEVISION: NOT AT ALL
2. FEELING DOWN, DEPRESSED OR HOPELESS: NOT AT ALL
8. MOVING OR SPEAKING SO SLOWLY THAT OTHER PEOPLE COULD HAVE NOTICED. OR THE OPPOSITE, BEING SO FIGETY OR RESTLESS THAT YOU HAVE BEEN MOVING AROUND A LOT MORE THAN USUAL: MORE THAN HALF THE DAYS
SUM OF ALL RESPONSES TO PHQ QUESTIONS 1-9: 10

## 2024-04-11 ASSESSMENT — ANXIETY QUESTIONNAIRES
6. BECOMING EASILY ANNOYED OR IRRITABLE: SEVERAL DAYS
3. WORRYING TOO MUCH ABOUT DIFFERENT THINGS: NOT AT ALL
4. TROUBLE RELAXING: NEARLY EVERY DAY
5. BEING SO RESTLESS THAT IT IS HARD TO SIT STILL: NOT AT ALL
IF YOU CHECKED OFF ANY PROBLEMS ON THIS QUESTIONNAIRE, HOW DIFFICULT HAVE THESE PROBLEMS MADE IT FOR YOU TO DO YOUR WORK, TAKE CARE OF THINGS AT HOME, OR GET ALONG WITH OTHER PEOPLE: EXTREMELY DIFFICULT
7. FEELING AFRAID AS IF SOMETHING AWFUL MIGHT HAPPEN: NOT AT ALL
1. FEELING NERVOUS, ANXIOUS, OR ON EDGE: MORE THAN HALF THE DAYS
2. NOT BEING ABLE TO STOP OR CONTROL WORRYING: NOT AT ALL
GAD7 TOTAL SCORE: 6

## 2024-04-11 NOTE — ASSESSMENT & PLAN NOTE
Patient was offered cardiology referral and chose instead to focus on my recommendations to get her anxiety under better control, stop adderall and use a different ADHD medication and stop use of marijuana which I believe are the primary drivers.

## 2024-04-11 NOTE — ASSESSMENT & PLAN NOTE
I recommend the patient return to her psychiatist and work to stop Adderall and discuss starting a non-stimulant ADHD medication as I believe her adderall may be driving these symptoms.

## 2024-04-11 NOTE — ASSESSMENT & PLAN NOTE
Patient is on latuda, remeron and propranolol.   I recommended she return to her psychiatrist and consider medication adjustment as I do not feel her anxiety is well controlled. She also already follows with a psychologist which I encouraged her to continue.

## 2024-04-11 NOTE — ASSESSMENT & PLAN NOTE
Has a bladder stimulator, this was turned off in 2023 and she is unsure what to do as her prior urologist has retired. She would like a referral to a new urologist which was provided.

## 2024-04-11 NOTE — PROGRESS NOTES
as needed for Smoking cessation (q2 hours) 110 each 3    amphetamine-dextroamphetamine (ADDERALL) 20 MG tablet Take 1 tablet by mouth every morning (before breakfast).      LATUDA 80 MG TABS tablet TAKE 1 TABLET BY MOUTH EVERY EVENING WITH A MEAL      mirtazapine (REMERON) 30 MG tablet TAKE 1 TABLET BY MOUTH EVERYDAY AT BEDTIME      hydrOXYzine pamoate (VISTARIL) 25 MG capsule Take 1 capsule by mouth 3 times daily as needed for Anxiety (Patient not taking: Reported on 4/11/2024) 20 capsule 0     No current facility-administered medications for this visit.       No Known Allergies     Review of Systems   Constitutional:  Negative for chills and fever.   HENT:  Negative for congestion and sinus pressure.    Eyes:  Negative for pain and redness.   Respiratory:  Negative for shortness of breath and wheezing.    Cardiovascular:  Positive for palpitations. Negative for chest pain.   Gastrointestinal:  Negative for abdominal pain and blood in stool.   Endocrine: Negative for polyuria.   Genitourinary:  Negative for difficulty urinating and dysuria.   Skin:  Negative for color change and rash.   Neurological:  Negative for weakness and numbness.   Psychiatric/Behavioral:  Negative for dysphoric mood and suicidal ideas.        /76   Pulse (!) 106   Ht 1.778 m (5' 10\")   Wt 83 kg (183 lb)   LMP 03/11/2024   SpO2 98%   BMI 26.26 kg/m²    Physical Exam  Vitals reviewed.   Constitutional:       General: She is not in acute distress.  HENT:      Head: Normocephalic and atraumatic.      Mouth/Throat:      Mouth: Mucous membranes are moist.      Pharynx: No oropharyngeal exudate.   Eyes:      Extraocular Movements: Extraocular movements intact.      Pupils: Pupils are equal, round, and reactive to light.   Cardiovascular:      Rate and Rhythm: Normal rate and regular rhythm.   Pulmonary:      Breath sounds: Normal breath sounds. No wheezing, rhonchi or rales.   Abdominal:      General: There is no distension.

## 2024-04-12 LAB
EST. AVERAGE GLUCOSE BLD GHB EST-MCNC: 96.8 MG/DL
HBA1C MFR BLD: 5 %
HCV AB SERPL QL IA: NORMAL

## 2024-05-11 PROBLEM — Z11.59 ENCOUNTER FOR HEPATITIS C SCREENING TEST FOR LOW RISK PATIENT: Status: RESOLVED | Noted: 2024-04-11 | Resolved: 2024-05-11

## 2024-07-31 ENCOUNTER — OFFICE VISIT (OUTPATIENT)
Age: 39
End: 2024-07-31
Payer: COMMERCIAL

## 2024-07-31 VITALS
SYSTOLIC BLOOD PRESSURE: 112 MMHG | OXYGEN SATURATION: 98 % | DIASTOLIC BLOOD PRESSURE: 70 MMHG | HEART RATE: 88 BPM | BODY MASS INDEX: 25.08 KG/M2 | WEIGHT: 174.8 LBS

## 2024-07-31 DIAGNOSIS — M54.31 BILATERAL SCIATICA: ICD-10-CM

## 2024-07-31 DIAGNOSIS — F32.A ANXIETY AND DEPRESSION: Primary | ICD-10-CM

## 2024-07-31 DIAGNOSIS — M54.32 BILATERAL SCIATICA: ICD-10-CM

## 2024-07-31 DIAGNOSIS — R00.2 PALPITATIONS: ICD-10-CM

## 2024-07-31 DIAGNOSIS — R32 URINARY INCONTINENCE, UNSPECIFIED TYPE: ICD-10-CM

## 2024-07-31 DIAGNOSIS — Z72.0 TOBACCO ABUSE: ICD-10-CM

## 2024-07-31 DIAGNOSIS — F41.9 ANXIETY AND DEPRESSION: Primary | ICD-10-CM

## 2024-07-31 DIAGNOSIS — I10 PRIMARY HYPERTENSION: ICD-10-CM

## 2024-07-31 PROCEDURE — G8427 DOCREV CUR MEDS BY ELIG CLIN: HCPCS | Performed by: INTERNAL MEDICINE

## 2024-07-31 PROCEDURE — 3074F SYST BP LT 130 MM HG: CPT | Performed by: INTERNAL MEDICINE

## 2024-07-31 PROCEDURE — 4004F PT TOBACCO SCREEN RCVD TLK: CPT | Performed by: INTERNAL MEDICINE

## 2024-07-31 PROCEDURE — 99214 OFFICE O/P EST MOD 30 MIN: CPT | Performed by: INTERNAL MEDICINE

## 2024-07-31 PROCEDURE — G8417 CALC BMI ABV UP PARAM F/U: HCPCS | Performed by: INTERNAL MEDICINE

## 2024-07-31 PROCEDURE — 3078F DIAST BP <80 MM HG: CPT | Performed by: INTERNAL MEDICINE

## 2024-07-31 RX ORDER — DEXTROAMPHETAMINE SACCHARATE, AMPHETAMINE ASPARTATE MONOHYDRATE, DEXTROAMPHETAMINE SULFATE AND AMPHETAMINE SULFATE 3.75; 3.75; 3.75; 3.75 MG/1; MG/1; MG/1; MG/1
15 CAPSULE, EXTENDED RELEASE ORAL EVERY MORNING
COMMUNITY
Start: 2024-07-17

## 2024-07-31 ASSESSMENT — PATIENT HEALTH QUESTIONNAIRE - PHQ9
4. FEELING TIRED OR HAVING LITTLE ENERGY: NOT AT ALL
5. POOR APPETITE OR OVEREATING: NOT AT ALL
SUM OF ALL RESPONSES TO PHQ QUESTIONS 1-9: 2
1. LITTLE INTEREST OR PLEASURE IN DOING THINGS: NOT AT ALL
3. TROUBLE FALLING OR STAYING ASLEEP: NOT AT ALL
SUM OF ALL RESPONSES TO PHQ QUESTIONS 1-9: 2
SUM OF ALL RESPONSES TO PHQ QUESTIONS 1-9: 2
SUM OF ALL RESPONSES TO PHQ9 QUESTIONS 1 & 2: 1
10. IF YOU CHECKED OFF ANY PROBLEMS, HOW DIFFICULT HAVE THESE PROBLEMS MADE IT FOR YOU TO DO YOUR WORK, TAKE CARE OF THINGS AT HOME, OR GET ALONG WITH OTHER PEOPLE: SOMEWHAT DIFFICULT
2. FEELING DOWN, DEPRESSED OR HOPELESS: SEVERAL DAYS
SUM OF ALL RESPONSES TO PHQ QUESTIONS 1-9: 2
7. TROUBLE CONCENTRATING ON THINGS, SUCH AS READING THE NEWSPAPER OR WATCHING TELEVISION: NOT AT ALL
8. MOVING OR SPEAKING SO SLOWLY THAT OTHER PEOPLE COULD HAVE NOTICED. OR THE OPPOSITE, BEING SO FIGETY OR RESTLESS THAT YOU HAVE BEEN MOVING AROUND A LOT MORE THAN USUAL: NOT AT ALL
9. THOUGHTS THAT YOU WOULD BE BETTER OFF DEAD, OR OF HURTING YOURSELF: NOT AT ALL
6. FEELING BAD ABOUT YOURSELF - OR THAT YOU ARE A FAILURE OR HAVE LET YOURSELF OR YOUR FAMILY DOWN: SEVERAL DAYS

## 2024-07-31 ASSESSMENT — ANXIETY QUESTIONNAIRES
GAD7 TOTAL SCORE: 6
1. FEELING NERVOUS, ANXIOUS, OR ON EDGE: SEVERAL DAYS
4. TROUBLE RELAXING: SEVERAL DAYS
2. NOT BEING ABLE TO STOP OR CONTROL WORRYING: SEVERAL DAYS
3. WORRYING TOO MUCH ABOUT DIFFERENT THINGS: SEVERAL DAYS
7. FEELING AFRAID AS IF SOMETHING AWFUL MIGHT HAPPEN: SEVERAL DAYS
IF YOU CHECKED OFF ANY PROBLEMS ON THIS QUESTIONNAIRE, HOW DIFFICULT HAVE THESE PROBLEMS MADE IT FOR YOU TO DO YOUR WORK, TAKE CARE OF THINGS AT HOME, OR GET ALONG WITH OTHER PEOPLE: SOMEWHAT DIFFICULT
6. BECOMING EASILY ANNOYED OR IRRITABLE: NOT AT ALL

## 2024-07-31 ASSESSMENT — ENCOUNTER SYMPTOMS
SHORTNESS OF BREATH: 0
EYE PAIN: 0
SINUS PRESSURE: 0
ABDOMINAL PAIN: 0
BLOOD IN STOOL: 0
WHEEZING: 0
EYE REDNESS: 0

## 2024-07-31 NOTE — ASSESSMENT & PLAN NOTE
Ongoing cessation counseling. She wasn't able to get the nicotine gum before but has insurance and would like this represcribed, this was sent.

## 2024-07-31 NOTE — PROGRESS NOTES
Sammi Heredia (:  1985) is a 39 y.o. female,Established patient, here for evaluation of the following chief complaint(s):  Hypertension, Depression, Anxiety, Back Pain (Lower back/Scoliosis or sciatic nerve), and Pain (Increasing pain in lower back and bilateral hips/knees possibly from Scoliosis or sciatic nerve)      ASSESSMENT/PLAN:  1. Anxiety and depression  Assessment & Plan:     She has had improved symptoms, anxiety and depression are both improved. She continues to follow with psychiatry and psychology. Palpitations are much decreased. I continue to recommend marijuana cessation.  2. Tobacco abuse  Assessment & Plan:     Ongoing cessation counseling. She wasn't able to get the nicotine gum before but has insurance and would like this represcribed, this was sent.  Orders:  -     nicotine polacrilex (NICORETTE) 2 MG gum; Take 1 each by mouth as needed for Smoking cessation (q2 hours) Do not exceed 10 doses per day., Disp-110 each, R-3Normal  3. Palpitations  Assessment & Plan:     Symptoms are much improved and were likely anxiety related and worsened by adderall.  4. Primary hypertension  Assessment & Plan:     Blood pressure is well controlled today. Continue lisinopril 20mg daily.  5. Urinary incontinence, unspecified type  Assessment & Plan:     Patient did not schedule with urology - re-referred.   Orders:  -     ALICE - Albert Cotto MD, Urology, [facility]  6. Bilateral sciatica  Assessment & Plan:     Refer to PT, exercises printed, voltaren gel prescribed for pain relief.  Orders:  -     [facility]  -     diclofenac sodium (VOLTAREN) 1 % GEL; Apply 4 g topically 4 times daily, Topical, 4 TIMES DAILY Starting Wed 2024, Disp-350 g, R-5, Normal      Return in about 3 months (around 10/31/2024) for Sciatica and HTN.    SUBJECTIVE/OBJECTIVE:  This is a 38 year old woman with hsitory of HTN, ADHD, depression and anxiety who presents to establish

## 2024-07-31 NOTE — ASSESSMENT & PLAN NOTE
She has had improved symptoms, anxiety and depression are both improved. She continues to follow with psychiatry and psychology. Palpitations are much decreased. I continue to recommend marijuana cessation.

## 2025-01-21 ENCOUNTER — OFFICE VISIT (OUTPATIENT)
Age: 40
End: 2025-01-21

## 2025-01-21 VITALS
BODY MASS INDEX: 25.08 KG/M2 | OXYGEN SATURATION: 98 % | RESPIRATION RATE: 16 BRPM | TEMPERATURE: 98.8 F | DIASTOLIC BLOOD PRESSURE: 82 MMHG | HEART RATE: 69 BPM | HEIGHT: 70 IN | SYSTOLIC BLOOD PRESSURE: 142 MMHG

## 2025-01-21 DIAGNOSIS — R03.0 ELEVATED BLOOD PRESSURE READING: ICD-10-CM

## 2025-01-21 DIAGNOSIS — L30.1 DYSHIDROTIC ECZEMA: Primary | ICD-10-CM

## 2025-01-21 RX ORDER — TRIAMCINOLONE ACETONIDE 5 MG/G
OINTMENT TOPICAL
Qty: 45 G | Refills: 1 | Status: SHIPPED | OUTPATIENT
Start: 2025-01-21 | End: 2025-01-28

## 2025-01-21 ASSESSMENT — ENCOUNTER SYMPTOMS: ROS SKIN COMMENTS: POSITIVE FOR ITCHING

## 2025-01-21 NOTE — PROGRESS NOTES
Sammi Heredia (:  1985) is a 39 y.o. 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 Dyshidrotic eczema  L30.1       2. Elevated blood pressure reading  R03.0           Ddx -  dyshidrotic eczema, contact dermatitis, dry skin, scabies, tinea, SJS, other  Suspect dyshidrotic eczema complicated by fact that she is  so hands are frequently wet  Management Given: triamcinolone ointment BID, unscented lotion throughout the day and aquaphor/vaseline at bedtime  Instructed to wear gloves at work to prevent hands from getting wet  Reviewed AVS with patient. All questions answered. Patient was instructed that if symptoms worsen, to return for worsening and FU with PCP. Also instructed to FU with PCP to get blood pressure rechecked    SUBJECTIVE/OBJECTIVE:  Patient presents for skin cracking on fingertips bilaterally for months, right hand worse than left, that is painful.  Also reports bumps on the sides of her fingers with associated itching.  She has not been putting anything on her hands for the symptoms.  She is a .  Denies new soaps, products, detergents or medications. Denies fever          Vitals:    25 1402   BP: (!) 142/82   Pulse: 69   Resp: 16   Temp: 98.8 °F (37.1 °C)   SpO2: 98%   Height: 1.778 m (5' 10\")     Review of Systems   Constitutional:  Negative for fever.   Skin:  Positive for wound.        Positive for itching     Physical Exam  Constitutional:       General: She is not in acute distress.     Appearance: Normal appearance. She is well-developed. She is not ill-appearing, toxic-appearing or diaphoretic.   HENT:      Head: Normocephalic and atraumatic.

## 2025-01-21 NOTE — PATIENT INSTRUCTIONS
New Prescriptions    TRIAMCINOLONE (ARISTOCORT) 0.5 % OINTMENT    Apply topically 2 times daily for up to 3 weeks to fingers     -use the steroid ointment prescribed twice daily    -apply a thick, unscented lotion (Gold Webster is a good one) to the hands multiple times throughout the day    -Before bed, apply vaseline or aquaphor to the hands and put on a pair of cotton gloves    -Wear gloves at work to prevent hands from getting wet    -Wear winter gloves when outside to protect your hands    -Follow up with your Primary Care Provider for reevaluation and to get your blood pressure rechecked since it was elevated here.  Call  [phone]

## 2025-01-25 ENCOUNTER — OFFICE VISIT (OUTPATIENT)
Age: 40
End: 2025-01-25

## 2025-01-25 VITALS
HEART RATE: 73 BPM | BODY MASS INDEX: 25.94 KG/M2 | DIASTOLIC BLOOD PRESSURE: 92 MMHG | OXYGEN SATURATION: 99 % | HEIGHT: 70 IN | WEIGHT: 181.2 LBS | SYSTOLIC BLOOD PRESSURE: 138 MMHG | TEMPERATURE: 97.9 F

## 2025-01-25 DIAGNOSIS — S60.459A SUPERFICIAL FOREIGN BODY OF FINGER WITHOUT MAJOR OPEN WOUND AND WITHOUT INFECTION, INITIAL ENCOUNTER: Primary | ICD-10-CM

## 2025-01-25 NOTE — PROGRESS NOTES
Sammi Heredia (:  1985) is a 39 y.o. female,New patient, here for evaluation of the following chief complaint(s):  Finger Laceration (Pt here for left ring finger cut at home.)      Subjective :   Sammi Heredia sustained laceration of finger 2 hours ago. Nature of injury: cut finger with scissors. Tetanus vaccination status reviewed: last tetanus booster within 10 years, tetanus re-vaccination not indicated.     Objective    Patient appears well, vitals are normal. Laceration 1 cm noted.  Description: clean wound edges, no foreign bodies. Neurovascular and tendon structures are intact.     Assessment & Plan :     Visit Diagnoses and Associated Orders       Superficial foreign body of finger without major open wound and without infection, initial encounter    -  Primary                    Anesthesia with 1% Lidocaine without Epinephrine. Wound cleansed, debrided of visible foreign material and necrotic tissue, and sutured. Antibiotic ointment and dressing applied.  Wound care instructions provided.  Observe for any signs of infection or other problems.  Return for suture removal in 10 days.          Michael Tomas, APRN - CNP

## 2025-02-10 LAB
HCT VFR BLD CALC: 35.4 % (ref 34–45)
HEMOGLOBIN: 11.2 G/DL (ref 11.2–15.7)
MCH RBC QN AUTO: 29.1 PG (ref 26–34)
MCHC RBC AUTO-ENTMCNC: 31.6 G/DL (ref 30.7–35.5)
MCV RBC AUTO: 91.9 FL (ref 80–100)
PDW BLD-RTO: 13.8 %
PLATELET # BLD: 333 X10(3)/MCL (ref 155–369)
PMV BLD AUTO: 9.9 FL (ref 8.8–12.5)
RBC # BLD: 3.85 X10(6)/MCL (ref 3.9–5.2)
SEX HORMONE BINDING GLOBULIN: 140 NMOL/L (ref 25–122)
TESTOSTERONE FREE-MALE: 1.5 PG/ML (ref 1.3–9.2)
TESTOSTERONE TOTAL-MALE: 25 NG/DL (ref 9–55)
TSH ULTRASENSITIVE: 1.99 MCIU/ML (ref 0.27–4.2)
WBC # BLD: 5.3 X10(3)/MCL (ref 3.7–10.3)

## 2025-04-03 ENCOUNTER — TELEPHONE (OUTPATIENT)
Age: 40
End: 2025-04-03

## 2025-04-17 LAB — PREGNANCY, URINE: NEGATIVE
